# Patient Record
Sex: FEMALE | Race: WHITE | NOT HISPANIC OR LATINO | Employment: FULL TIME | URBAN - METROPOLITAN AREA
[De-identification: names, ages, dates, MRNs, and addresses within clinical notes are randomized per-mention and may not be internally consistent; named-entity substitution may affect disease eponyms.]

---

## 2024-05-17 RX ORDER — ATENOLOL 50 MG/1
1 TABLET ORAL DAILY
COMMUNITY
Start: 2018-11-20 | End: 2024-06-03 | Stop reason: ALTCHOICE

## 2024-05-17 RX ORDER — FLUOXETINE 10 MG/1
CAPSULE ORAL EVERY 24 HOURS
COMMUNITY
End: 2024-06-03 | Stop reason: ALTCHOICE

## 2024-05-17 RX ORDER — METOPROLOL SUCCINATE 50 MG/1
50 TABLET, EXTENDED RELEASE ORAL
COMMUNITY
Start: 2023-12-26 | End: 2024-06-03 | Stop reason: SDUPTHER

## 2024-05-17 RX ORDER — BUTALBITAL, ACETAMINOPHEN AND CAFFEINE 50; 325; 40 MG/1; MG/1; MG/1
1-2 TABLET ORAL 3 TIMES DAILY
COMMUNITY
Start: 2018-11-20 | End: 2024-06-03 | Stop reason: ALTCHOICE

## 2024-05-17 RX ORDER — FLUOXETINE HYDROCHLORIDE 20 MG/1
CAPSULE ORAL EVERY 24 HOURS
COMMUNITY
End: 2024-06-03 | Stop reason: ALTCHOICE

## 2024-05-17 RX ORDER — DIHYDROERGOTAMINE MESYLATE 4 MG/ML
SPRAY NASAL
COMMUNITY
Start: 2018-11-20 | End: 2024-06-03 | Stop reason: ALTCHOICE

## 2024-05-17 RX ORDER — MAGNESIUM SULFATE 100 MG
CAPSULE ORAL
COMMUNITY
End: 2024-06-03 | Stop reason: ALTCHOICE

## 2024-05-17 RX ORDER — TIZANIDINE 4 MG/1
1 TABLET ORAL NIGHTLY
COMMUNITY
Start: 2019-07-17 | End: 2024-06-03 | Stop reason: ALTCHOICE

## 2024-05-17 RX ORDER — CETIRIZINE HYDROCHLORIDE 10 MG/1
10 TABLET ORAL
COMMUNITY

## 2024-05-17 RX ORDER — DESVENLAFAXINE 50 MG/1
50 TABLET, EXTENDED RELEASE ORAL
COMMUNITY
Start: 2022-07-20 | End: 2024-06-03 | Stop reason: ALTCHOICE

## 2024-06-03 ENCOUNTER — OFFICE VISIT (OUTPATIENT)
Dept: PRIMARY CARE | Facility: CLINIC | Age: 34
End: 2024-06-03
Payer: COMMERCIAL

## 2024-06-03 VITALS
SYSTOLIC BLOOD PRESSURE: 130 MMHG | OXYGEN SATURATION: 98 % | TEMPERATURE: 96.4 F | WEIGHT: 230 LBS | DIASTOLIC BLOOD PRESSURE: 64 MMHG | BODY MASS INDEX: 43.46 KG/M2 | HEART RATE: 81 BPM

## 2024-06-03 DIAGNOSIS — Z00.00 ANNUAL PHYSICAL EXAM: Primary | ICD-10-CM

## 2024-06-03 DIAGNOSIS — J30.2 SEASONAL ALLERGIC RHINITIS, UNSPECIFIED TRIGGER: ICD-10-CM

## 2024-06-03 DIAGNOSIS — I10 PRIMARY HYPERTENSION: ICD-10-CM

## 2024-06-03 DIAGNOSIS — G43.909 MIGRAINE WITHOUT STATUS MIGRAINOSUS, NOT INTRACTABLE, UNSPECIFIED MIGRAINE TYPE: ICD-10-CM

## 2024-06-03 DIAGNOSIS — F41.1 GAD (GENERALIZED ANXIETY DISORDER): ICD-10-CM

## 2024-06-03 DIAGNOSIS — Z11.59 NEED FOR HEPATITIS C SCREENING TEST: ICD-10-CM

## 2024-06-03 DIAGNOSIS — Z01.89 ENCOUNTER FOR ROUTINE LABORATORY TESTING: ICD-10-CM

## 2024-06-03 PROCEDURE — 1036F TOBACCO NON-USER: CPT | Performed by: NURSE PRACTITIONER

## 2024-06-03 PROCEDURE — 99385 PREV VISIT NEW AGE 18-39: CPT | Performed by: NURSE PRACTITIONER

## 2024-06-03 PROCEDURE — 3075F SYST BP GE 130 - 139MM HG: CPT | Performed by: NURSE PRACTITIONER

## 2024-06-03 PROCEDURE — 3078F DIAST BP <80 MM HG: CPT | Performed by: NURSE PRACTITIONER

## 2024-06-03 RX ORDER — DESVENLAFAXINE 100 MG/1
200 TABLET, EXTENDED RELEASE ORAL DAILY
COMMUNITY

## 2024-06-03 RX ORDER — METOPROLOL SUCCINATE 50 MG/1
50 TABLET, EXTENDED RELEASE ORAL
Qty: 90 TABLET | Refills: 3 | Status: SHIPPED | OUTPATIENT
Start: 2024-06-03

## 2024-06-03 ASSESSMENT — ENCOUNTER SYMPTOMS
BRUISES/BLEEDS EASILY: 0
SPEECH DIFFICULTY: 0
NECK PAIN: 0
POLYPHAGIA: 0
BACK PAIN: 0
ADENOPATHY: 0
DEPRESSION: 0
CHEST TIGHTNESS: 0
ABDOMINAL PAIN: 0
FATIGUE: 0
OCCASIONAL FEELINGS OF UNSTEADINESS: 0
HEADACHES: 0
VOMITING: 0
HEMATURIA: 0
AGITATION: 0
LOSS OF SENSATION IN FEET: 0
POLYDIPSIA: 0
NAUSEA: 0
COUGH: 0
DIAPHORESIS: 0
CONFUSION: 0
BLOOD IN STOOL: 0
DYSURIA: 0
DIZZINESS: 0
FLANK PAIN: 0
WOUND: 0
FEVER: 0
SEIZURES: 0
PALPITATIONS: 0
FACIAL ASYMMETRY: 0
SHORTNESS OF BREATH: 0
CHILLS: 0

## 2024-06-03 ASSESSMENT — PAIN SCALES - GENERAL: PAINLEVEL: 0-NO PAIN

## 2024-06-03 ASSESSMENT — LIFESTYLE VARIABLES
HOW OFTEN DURING THE LAST YEAR HAVE YOU HAD A FEELING OF GUILT OR REMORSE AFTER DRINKING: NEVER
AUDIT-C TOTAL SCORE: 0
HOW OFTEN DURING THE LAST YEAR HAVE YOU FAILED TO DO WHAT WAS NORMALLY EXPECTED FROM YOU BECAUSE OF DRINKING: NEVER
HOW OFTEN DURING THE LAST YEAR HAVE YOU NEEDED AN ALCOHOLIC DRINK FIRST THING IN THE MORNING TO GET YOURSELF GOING AFTER A NIGHT OF HEAVY DRINKING: NEVER
HAS A RELATIVE, FRIEND, DOCTOR, OR ANOTHER HEALTH PROFESSIONAL EXPRESSED CONCERN ABOUT YOUR DRINKING OR SUGGESTED YOU CUT DOWN: NO
HOW OFTEN DURING THE LAST YEAR HAVE YOU BEEN UNABLE TO REMEMBER WHAT HAPPENED THE NIGHT BEFORE BECAUSE YOU HAD BEEN DRINKING: NEVER
HOW OFTEN DURING THE LAST YEAR HAVE YOU FOUND THAT YOU WERE NOT ABLE TO STOP DRINKING ONCE YOU HAD STARTED: NEVER
HOW MANY STANDARD DRINKS CONTAINING ALCOHOL DO YOU HAVE ON A TYPICAL DAY: PATIENT DOES NOT DRINK
AUDIT TOTAL SCORE: 0
SKIP TO QUESTIONS 9-10: 1
HOW OFTEN DO YOU HAVE SIX OR MORE DRINKS ON ONE OCCASION: NEVER
HAVE YOU OR SOMEONE ELSE BEEN INJURED AS A RESULT OF YOUR DRINKING: NO
HOW OFTEN DO YOU HAVE A DRINK CONTAINING ALCOHOL: NEVER

## 2024-06-03 ASSESSMENT — PROMIS GLOBAL HEALTH SCALE
RATE_SOCIAL_SATISFACTION: EXCELLENT
RATE_AVERAGE_FATIGUE: MILD
RATE_PHYSICAL_HEALTH: VERY GOOD
RATE_AVERAGE_PAIN: 0
EMOTIONAL_PROBLEMS: RARELY
CARRYOUT_SOCIAL_ACTIVITIES: EXCELLENT
RATE_MENTAL_HEALTH: VERY GOOD
CARRYOUT_PHYSICAL_ACTIVITIES: COMPLETELY
RATE_QUALITY_OF_LIFE: VERY GOOD
RATE_GENERAL_HEALTH: GOOD

## 2024-06-03 ASSESSMENT — PATIENT HEALTH QUESTIONNAIRE - PHQ9
2. FEELING DOWN, DEPRESSED OR HOPELESS: NOT AT ALL
1. LITTLE INTEREST OR PLEASURE IN DOING THINGS: NOT AT ALL
SUM OF ALL RESPONSES TO PHQ9 QUESTIONS 1 AND 2: 0

## 2024-06-03 NOTE — PROGRESS NOTES
[facility]: [facility]  PHYSICAL EXAM      Ginny Tinoco is a 34 y.o. female that is presenting today to re-establish with PCP and requesting Annual Physical Exam.    Ms. Tinoco reports she takes her antihypertensives as directed. She does not monitor her home BP. She is trying to follow a low sodium diet. Denies CP, SOB, dizziness, HERRON.    Ms. Tinoco reports she experiences migraines once every other month on average. Nurtec effective. She tried and failed Imitrex, Maxalt.    She has been on Wegovy/Trizepitide since 02/2023.  Weight loss of 20# as of 12/2023.  She is unable to locate the medication and needs to be switched to an alternative.    Patient diagnosed with ETHAN. Successfully managed with Desvenlafaxine 200 mg daily. She is followed by psychiatry, Dr. Bety Singh with [facility].  She is seen every 4 months.    Assessment/Plan   Diagnoses and all orders for this visit:    Annual physical exam    Primary hypertension  -     Good control  -     CBC and Auto Differential; Future  -     Comprehensive Metabolic Panel; Future  -     Lipid Panel; Future  -     TSH with reflex to Free T4 if abnormal; Future  -     Continue metoprolol succinate XL (Toprol-XL) 50 mg 24 hr tablet; Take 1 tablet (50 mg) by mouth once daily.    Migraine without status migrainosus, not intractable, unspecified migraine type  -    Stable on current medication regimen  -    Continue rimegepant (Nurtec ODT) 75 mg tablet,disintegrating; Take 1 tablet (75 mg) by mouth once daily as needed (as need for Migraine).    Seasonal allergic rhinitis, unspecified trigger        -     Continue OTC Zyrtec as needed    ETHAN (generalized anxiety disorder)  -     Continue established follow up with Psychiatry  -     Continue Desvenlafaxine 200 mg daily  -     TSH with reflex to Free T4 if abnormal; Future    Encounter for routine laboratory testing  -     CBC and Auto Differential; Future  -     Comprehensive  Metabolic Panel; Future  -     Lipid Panel; Future  -     TSH with reflex to Free T4 if abnormal; Future    Need for hepatitis C screening test  -     Hepatitis C antibody; Future    Other orders  -     Follow Up In Primary Care - Health Maintenance; Future  -     Patient was provided with contact information for weight loss specialist, Karla Dick CNP    Subjective   HPI  Review of Systems   Constitutional:  Negative for chills, diaphoresis, fatigue and fever.   HENT:  Negative for hearing loss and mouth sores.    Eyes:  Negative for visual disturbance.   Respiratory:  Negative for cough, chest tightness and shortness of breath.    Cardiovascular:  Negative for chest pain, palpitations and leg swelling.   Gastrointestinal:  Negative for abdominal pain, blood in stool, nausea and vomiting.   Endocrine: Negative for cold intolerance, heat intolerance, polydipsia, polyphagia and polyuria.   Genitourinary:  Negative for dysuria, flank pain and hematuria.   Musculoskeletal:  Negative for back pain and neck pain.   Skin:  Negative for rash and wound.   Allergic/Immunologic: Positive for environmental allergies. Negative for food allergies and immunocompromised state.   Neurological:  Negative for dizziness, seizures, syncope, facial asymmetry, speech difficulty and headaches.   Hematological:  Negative for adenopathy. Does not bruise/bleed easily.   Psychiatric/Behavioral:  Negative for agitation and confusion.       Objective   Vitals:    06/03/24 1133   BP: 130/64   Pulse: 81   Temp: 35.8 °C (96.4 °F)   SpO2: 98%     Body mass index is 43.46 kg/m².  Physical Exam  Vitals and nursing note reviewed.   Constitutional:       General: She is not in acute distress.     Appearance: Normal appearance. She is not ill-appearing.   HENT:      Head: Normocephalic and atraumatic.      Right Ear: Tympanic membrane, ear canal and external ear normal. There is no impacted cerumen.      Left Ear: Tympanic membrane, ear canal and  "external ear normal. There is no impacted cerumen.      Nose: Nose normal.      Mouth/Throat:      Mouth: Mucous membranes are moist.      Pharynx: Oropharynx is clear. No oropharyngeal exudate or posterior oropharyngeal erythema.   Eyes:      General: No scleral icterus.        Right eye: No discharge.         Left eye: No discharge.      Extraocular Movements: Extraocular movements intact.      Conjunctiva/sclera: Conjunctivae normal.      Pupils: Pupils are equal, round, and reactive to light.   Cardiovascular:      Rate and Rhythm: Normal rate and regular rhythm.      Pulses: Normal pulses.      Heart sounds: Normal heart sounds. No murmur heard.  Pulmonary:      Effort: Pulmonary effort is normal. No respiratory distress.      Breath sounds: Normal breath sounds.   Abdominal:      General: Abdomen is flat. Bowel sounds are normal. There is no distension.      Palpations: Abdomen is soft. There is no mass.      Tenderness: There is no abdominal tenderness. There is no right CVA tenderness or left CVA tenderness.      Hernia: No hernia is present.   Musculoskeletal:         General: No tenderness. Normal range of motion.      Cervical back: No tenderness.      Right lower leg: No edema.      Left lower leg: No edema.   Lymphadenopathy:      Cervical: No cervical adenopathy.   Skin:     General: Skin is warm and dry.      Coloration: Skin is not jaundiced.      Findings: No rash.   Neurological:      General: No focal deficit present.      Mental Status: She is alert and oriented to person, place, and time. Mental status is at baseline.   Psychiatric:         Mood and Affect: Mood normal.         Behavior: Behavior normal.       Diagnostic Results   Lab Results   Component Value Date    GLUCOSE 87 04/06/2018    CALCIUM 9.1 04/06/2018     04/06/2018    K 4.2 04/06/2018    CO2 26 04/06/2018     04/06/2018    BUN 10 04/06/2018    CREATININE 0.6 04/06/2018     No results found for: \"ALT\", \"AST\", \"GGT\", " "\"ALKPHOS\", \"BILITOT\"  Lab Results   Component Value Date    WBC 13.1 (H) 2018    HGB 13.0 2018    HCT 40.9 2018    MCV 85.4 2018     2018     Lab Results   Component Value Date    CHOL 176 2018     Lab Results   Component Value Date    HDL 40 (L) 2018     Lab Results   Component Value Date    LDLCALC 120 2018     Lab Results   Component Value Date    TRIG 80 2018     No components found for: \"CHOLHDL\"  Lab Results   Component Value Date    HGBA1C 5.4 2023     Other labs not included in the list above were reviewed either before or during this encounter.    History   Past Medical History:   Diagnosis Date    Other conditions influencing health status 2013    Infected Superficial Injury    Other specified health status 2014    No pertinent past surgical history    Personal history of diseases of the skin and subcutaneous tissue 2013    History of sebaceous cyst    Personal history of other diseases of the respiratory system 2014    History of upper respiratory infection     Past Surgical History:   Procedure Laterality Date    OTHER SURGICAL HISTORY  2018     section     No family history on file.  Social History     Socioeconomic History    Marital status:      Spouse name: Not on file    Number of children: Not on file    Years of education: Not on file    Highest education level: Not on file   Occupational History    Not on file   Tobacco Use    Smoking status: Never     Passive exposure: Never    Smokeless tobacco: Never   Substance and Sexual Activity    Alcohol use: Never    Drug use: Never    Sexual activity: Not on file   Other Topics Concern    Not on file   Social History Narrative    Not on file     Social Determinants of Health     Financial Resource Strain: Low Risk  (2023)    Received from [facility]    Overall Financial Resource Strain (CARDIA)     Difficulty of Paying Living " Expenses: Not hard at all   Food Insecurity: No Food Insecurity (1/9/2023)    Received from [facility]    Hunger Vital Sign     Worried About Running Out of Food in the Last Year: Never true     Ran Out of Food in the Last Year: Never true   Transportation Needs: No Transportation Needs (1/9/2023)    Received from [facility]    PRAPARE - Transportation     Lack of Transportation (Medical): No     Lack of Transportation (Non-Medical): No   Physical Activity: Inactive (1/9/2023)    Received from [facility]    Exercise Vital Sign     Days of Exercise per Week: 0 days     Minutes of Exercise per Session: 0 min   Stress: No Stress Concern Present (1/9/2023)    Received from [facility]    [employer] - Occupational Stress Questionnaire     Feeling of Stress : Only a little   Social Connections: Moderately Integrated (1/9/2023)    Received from [facility]    Social Connection and Isolation Panel [NHANES]     Frequency of Communication with Friends and Family: More than three times a week     Frequency of Social Gatherings with Friends and Family: More than three times a week     Attends Gnosticist Services: 1 to 4 times per year     Active Member of Clubs or Organizations: No     Attends Club or Organization Meetings: Never     Marital Status:    Intimate Partner Violence: Not on file   Housing Stability: Low Risk  (1/9/2023)    Received from [facility]    Housing Stability Vital Sign     Unable to Pay for Housing in the Last Year: No     Number of Places Lived in the Last Year: 1     Unstable Housing in the Last Year: No     No Known Allergies    Current Outpatient Medications on File Prior to Visit   Medication Sig Dispense Refill    desvenlafaxine 100 mg 24 hr tablet Take 2 tablets (200 mg) by mouth once daily. Do not crush, chew, or split.      metoprolol succinate XL (Toprol-XL) 50 mg 24 hr tablet Take 1 tablet (50 mg) by mouth once daily.       [DISCONTINUED] desvenlafaxine 50 mg 24 hr tablet Take 1 tablet (50 mg) by mouth once daily.      cetirizine (ZyrTEC) 10 mg tablet Take 1 tablet (10 mg) by mouth once daily.      rimegepant (Nurtec ODT) 75 mg tablet,disintegrating Take 1 tablet (75 mg) by mouth once daily as needed.      tirzepatide, weight loss, (Zepbound) 7.5 mg/0.5 mL injection Inject 7.5 mg under the skin once a week.      tiZANidine (Zanaflex) 4 mg tablet Take 1 tablet (4 mg) by mouth once daily at bedtime.      UNABLE TO FIND as directed      [DISCONTINUED] atenolol (Tenormin) 50 mg tablet Take 1 tablet (50 mg) by mouth once daily.      [DISCONTINUED] butalbital-acetaminophen-caff -40 mg tablet Take 1-2 tablets by mouth 3 times a day.      [DISCONTINUED] dihydroergotamine (Migranal) 0.5 mg/pump act. (4 mg/mL) nasal spray Administer into affected nostril(s).      [DISCONTINUED] FLUoxetine (PROzac) 10 mg capsule once every 24 hours.      [DISCONTINUED] FLUoxetine (PROzac) 20 mg capsule once every 24 hours.      [DISCONTINUED] magnesium sulfate 100 mg capsule        No current facility-administered medications on file prior to visit.     Immunization History   Administered Date(s) Administered    Flu vaccine (IIV4), preservative free *Check age/dose* 09/21/2020, 09/18/2021    Flu vaccine, quadrivalent, no egg protein, age 6 month or greater (FLUCELVAX) 09/23/2023    Flu vaccine, quadrivalent, recombinant, preservative free, adult (FLUBLOK) 10/07/2019    Influenza, Unspecified 10/01/2018, 10/09/2020    Influenza, seasonal, injectable, preservative free 09/28/2015    MMR vaccine, subcutaneous (MMR II) 05/07/2019    Pfizer Purple Cap SARS-CoV-2 02/25/2021, 03/19/2021, 10/22/2021    Tdap vaccine, age 7 year and older (BOOSTRIX, ADACEL) 12/14/2015, 09/23/2021     Patient's medical history was reviewed and updated either before or during this encounter.       Lroi Gruber, APRN-CNP

## 2024-10-31 ENCOUNTER — TELEMEDICINE (OUTPATIENT)
Dept: PRIMARY CARE | Facility: CLINIC | Age: 34
End: 2024-10-31
Payer: COMMERCIAL

## 2024-10-31 DIAGNOSIS — J15.7 PNEUMONIA DUE TO MYCOPLASMA PNEUMONIAE, UNSPECIFIED LATERALITY, UNSPECIFIED PART OF LUNG: Primary | ICD-10-CM

## 2024-10-31 DIAGNOSIS — J98.01 COUGH DUE TO BRONCHOSPASM: ICD-10-CM

## 2024-10-31 PROCEDURE — 99214 OFFICE O/P EST MOD 30 MIN: CPT | Performed by: NURSE PRACTITIONER

## 2024-10-31 RX ORDER — AZITHROMYCIN 250 MG/1
TABLET, FILM COATED ORAL
Qty: 6 TABLET | Refills: 0 | Status: SHIPPED | OUTPATIENT
Start: 2024-10-31 | End: 2024-11-05

## 2024-10-31 RX ORDER — ALBUTEROL SULFATE 90 UG/1
2 INHALANT RESPIRATORY (INHALATION) EVERY 4 HOURS PRN
Qty: 6.7 G | Refills: 0 | Status: SHIPPED | OUTPATIENT
Start: 2024-10-31 | End: 2024-11-30

## 2024-10-31 ASSESSMENT — ENCOUNTER SYMPTOMS: COUGH: 1

## 2024-10-31 NOTE — PROGRESS NOTES
Subjective   Patient ID: Ginny Tinoco is a 34 y.o. female who presents for Cough.  Cough x 6 weeks, now 3 days T >101  Yesterday felt rumbling in chest and wheezy,   [facility]V advised in person visit.  Went to [facility] and was told to ride it out, lungs were clear.Temp continues, feels awful.  Daughter recovering from mycoplasmal pneumonia.  Used inhaler as child.  Currently ibuprofen and Mucinex DM.      Cough  Associated symptoms include chills, a fever and wheezing. Pertinent negatives include no headaches or shortness of breath.       Review of Systems   Constitutional:  Positive for chills, fatigue and fever.   HENT:  Positive for congestion.    Respiratory:  Positive for cough and wheezing. Negative for shortness of breath.    Neurological:  Negative for dizziness and headaches.       Objective   Physical Exam  Constitutional:       General: She is not in acute distress.     Appearance: She is ill-appearing. She is not toxic-appearing.   Pulmonary:      Effort: Pulmonary effort is normal.   Musculoskeletal:      Cervical back: Neck supple.   Neurological:      General: No focal deficit present.      Mental Status: She is oriented to person, place, and time.      Comments: grossly         Assessment/Plan   Diagnoses and all orders for this visit:  Pneumonia due to Mycoplasma pneumoniae, unspecified laterality, unspecified part of lung  -     albuterol (Proventil HFA) 90 mcg/actuation inhaler; Inhale 2 puffs every 4 hours if needed for wheezing, shortness of breath or other (cough).  -     azithromycin (Zithromax) 250 mg tablet; Take 2 tabs (500 mg) by mouth today, than 1 daily for 4 days.  Cough due to bronchospasm  -     albuterol (Proventil HFA) 90 mcg/actuation inhaler; Inhale 2 puffs every 4 hours if needed for wheezing, shortness of breath or other (cough).           Beata Negrete, DIDI-CNP 10/31/24 1:56 PM

## 2024-11-25 ASSESSMENT — ENCOUNTER SYMPTOMS
DIZZINESS: 0
FEVER: 1
SHORTNESS OF BREATH: 0
CHILLS: 1
WHEEZING: 1
FATIGUE: 1
HEADACHES: 0

## 2024-11-25 NOTE — ASSESSMENT & PLAN NOTE
Hx c/w  LRI and second sickening, suggests secondary infection.    Exposed to mycoplasmal pneumonia, will treat.  Reviewed correct use of MDI, expected or typical course versus symptoms to report.  Continue Mucinex DM if needed  Reviewed symptoms that need urgent re-evaluation, no red flags today.  Fluids, rest, follow with PCP if not beginning to resolve 3-4 days, sooner if worse.   ED if any sudden or acute changes.

## 2025-06-09 ENCOUNTER — APPOINTMENT (OUTPATIENT)
Dept: PRIMARY CARE | Facility: CLINIC | Age: 35
End: 2025-06-09
Payer: COMMERCIAL